# Patient Record
Sex: MALE | Race: OTHER | Employment: FULL TIME | URBAN - METROPOLITAN AREA
[De-identification: names, ages, dates, MRNs, and addresses within clinical notes are randomized per-mention and may not be internally consistent; named-entity substitution may affect disease eponyms.]

---

## 2017-03-03 ENCOUNTER — APPOINTMENT (OUTPATIENT)
Dept: OTHER | Facility: HOSPITAL | Age: 32
End: 2017-03-03
Attending: EMERGENCY MEDICINE

## 2017-03-06 ENCOUNTER — OFFICE VISIT (OUTPATIENT)
Dept: OTHER | Facility: HOSPITAL | Age: 32
End: 2017-03-06
Attending: EMERGENCY MEDICINE

## 2017-03-06 ENCOUNTER — HOSPITAL ENCOUNTER (OUTPATIENT)
Dept: GENERAL RADIOLOGY | Facility: HOSPITAL | Age: 32
Discharge: HOME | End: 2017-03-06
Attending: EMERGENCY MEDICINE

## 2017-03-06 DIAGNOSIS — R76.11 TUBERCULIN SKIN TEST (TST) POSITIVE: ICD-10-CM

## 2017-03-06 DIAGNOSIS — R76.11 TUBERCULIN SKIN TEST (TST) POSITIVE: Primary | ICD-10-CM

## 2017-03-06 PROCEDURE — 71010 XR CHEST AP/PA (1 VIEW) (CPT=71010): CPT

## 2017-11-08 ENCOUNTER — APPOINTMENT (OUTPATIENT)
Dept: OTHER | Facility: HOSPITAL | Age: 32
End: 2017-11-08
Attending: EMERGENCY MEDICINE

## 2018-07-24 ENCOUNTER — HOSPITAL ENCOUNTER (OUTPATIENT)
Age: 33
Discharge: HOME | End: 2018-07-24
Attending: EMERGENCY MEDICINE
Payer: MEDICAID

## 2018-07-24 VITALS
DIASTOLIC BLOOD PRESSURE: 82 MMHG | RESPIRATION RATE: 20 BRPM | TEMPERATURE: 98 F | BODY MASS INDEX: 26.66 KG/M2 | SYSTOLIC BLOOD PRESSURE: 128 MMHG | WEIGHT: 180 LBS | OXYGEN SATURATION: 100 % | HEIGHT: 69 IN | HEART RATE: 80 BPM

## 2018-07-24 DIAGNOSIS — N49.2 SCROTAL ABSCESS: Primary | ICD-10-CM

## 2018-07-24 LAB — GLUCOSE BLDC GLUCOMTR-MCNC: 105 MG/DL (ref 70–99)

## 2018-07-24 PROCEDURE — 87147 CULTURE TYPE IMMUNOLOGIC: CPT | Performed by: EMERGENCY MEDICINE

## 2018-07-24 PROCEDURE — 87070 CULTURE OTHR SPECIMN AEROBIC: CPT | Performed by: EMERGENCY MEDICINE

## 2018-07-24 PROCEDURE — 82962 GLUCOSE BLOOD TEST: CPT

## 2018-07-24 PROCEDURE — 99204 OFFICE O/P NEW MOD 45 MIN: CPT

## 2018-07-24 PROCEDURE — 87186 SC STD MICRODIL/AGAR DIL: CPT | Performed by: EMERGENCY MEDICINE

## 2018-07-24 PROCEDURE — 55100 DRAINAGE OF SCROTUM ABSCESS: CPT

## 2018-07-24 PROCEDURE — 87205 SMEAR GRAM STAIN: CPT | Performed by: EMERGENCY MEDICINE

## 2018-07-24 RX ORDER — AMOXICILLIN AND CLAVULANATE POTASSIUM 875; 125 MG/1; MG/1
1 TABLET, FILM COATED ORAL 2 TIMES DAILY
Qty: 20 TABLET | Refills: 0 | Status: SHIPPED | OUTPATIENT
Start: 2018-07-24 | End: 2018-07-26 | Stop reason: ALTCHOICE

## 2018-07-24 NOTE — ED INITIAL ASSESSMENT (HPI)
Was at work Saturday and noticed a lump/growth or ? Hernia in between his legs under testicles with pain . Was lifting objects at work.

## 2018-07-24 NOTE — ED NOTES
Culture sent to lab , dressing applied and to change 2 x a day and as needed. Fill and take po meds, call pcp and make appointment for follow up in office in 2 days watch for fever increase pain and drainage.

## 2018-07-24 NOTE — ED PROVIDER NOTES
Patient Seen in: [facility] In [address] [place] [address]    History   Patient presents with:  Eval-G (gynecologic)    Stated Complaint: groin pain    HPI    Patient is a 61-year-old male with previous history of abscess on the face who presents now focal swelling or tenderness  Skin: As above      ED Course     Labs Reviewed   AEROBIC BACTERIAL CULTURE       ED Course as of Jul 24 1143  ------------------------------------------------------------   Procedure note:  After anesthetizing the area with 1

## 2018-07-26 ENCOUNTER — TELEPHONE (OUTPATIENT)
Dept: SURGERY | Facility: CLINIC | Age: 33
End: 2018-07-26

## 2018-07-26 ENCOUNTER — HOSPITAL ENCOUNTER (OUTPATIENT)
Age: 33
Discharge: HOME | End: 2018-07-26
Attending: EMERGENCY MEDICINE
Payer: MEDICAID

## 2018-07-26 VITALS
TEMPERATURE: 98 F | WEIGHT: 185 LBS | BODY MASS INDEX: 27 KG/M2 | HEART RATE: 89 BPM | OXYGEN SATURATION: 99 % | RESPIRATION RATE: 16 BRPM | DIASTOLIC BLOOD PRESSURE: 74 MMHG | SYSTOLIC BLOOD PRESSURE: 113 MMHG

## 2018-07-26 DIAGNOSIS — N49.2 SCROTAL WALL ABSCESS: Primary | ICD-10-CM

## 2018-07-26 PROCEDURE — 99213 OFFICE O/P EST LOW 20 MIN: CPT

## 2018-07-26 RX ORDER — CLINDAMYCIN HYDROCHLORIDE 300 MG/1
300 CAPSULE ORAL 4 TIMES DAILY
Qty: 40 CAPSULE | Refills: 0 | Status: SHIPPED | OUTPATIENT
Start: 2018-07-26 | End: 2021-05-12

## 2018-07-26 NOTE — ED INITIAL ASSESSMENT (HPI)
Patient here for a scrotal abscess that was incised and drained here two days ago. Was instructed to follow up here in the immediate care. States he is feeling better. Pain is better controlled and only aggravated with movement. Denies any fevers.  Continu

## 2018-08-09 NOTE — TELEPHONE ENCOUNTER
Phoned pt and spoke with him. Offered the next soonest available consultation (September). Pt states he is better now and declined the appt offer. He was thankful.

## 2021-05-12 ENCOUNTER — OFFICE VISIT (OUTPATIENT)
Dept: FAMILY MEDICINE CLINIC | Facility: CLINIC | Age: 36
End: 2021-05-12
Payer: COMMERCIAL

## 2021-05-12 ENCOUNTER — HOSPITAL ENCOUNTER (OUTPATIENT)
Dept: GENERAL RADIOLOGY | Age: 36
Discharge: HOME | End: 2021-05-12
Attending: FAMILY MEDICINE
Payer: COMMERCIAL

## 2021-05-12 VITALS
HEART RATE: 71 BPM | DIASTOLIC BLOOD PRESSURE: 80 MMHG | BODY MASS INDEX: 28.49 KG/M2 | HEIGHT: 69 IN | SYSTOLIC BLOOD PRESSURE: 132 MMHG | WEIGHT: 192.38 LBS

## 2021-05-12 DIAGNOSIS — G89.29 CHRONIC LEFT SHOULDER PAIN: ICD-10-CM

## 2021-05-12 DIAGNOSIS — M25.512 CHRONIC LEFT SHOULDER PAIN: ICD-10-CM

## 2021-05-12 DIAGNOSIS — K42.9 UMBILICAL HERNIA WITHOUT OBSTRUCTION AND WITHOUT GANGRENE: ICD-10-CM

## 2021-05-12 DIAGNOSIS — L20.84 INTRINSIC ECZEMA: ICD-10-CM

## 2021-05-12 DIAGNOSIS — M54.6 CHRONIC LEFT-SIDED THORACIC BACK PAIN: Primary | ICD-10-CM

## 2021-05-12 DIAGNOSIS — G89.29 CHRONIC LEFT-SIDED THORACIC BACK PAIN: Primary | ICD-10-CM

## 2021-05-12 PROCEDURE — 99203 OFFICE O/P NEW LOW 30 MIN: CPT | Performed by: FAMILY MEDICINE

## 2021-05-12 PROCEDURE — 3075F SYST BP GE 130 - 139MM HG: CPT | Performed by: FAMILY MEDICINE

## 2021-05-12 PROCEDURE — 3079F DIAST BP 80-89 MM HG: CPT | Performed by: FAMILY MEDICINE

## 2021-05-12 PROCEDURE — 3008F BODY MASS INDEX DOCD: CPT | Performed by: FAMILY MEDICINE

## 2021-05-12 PROCEDURE — 81003 URINALYSIS AUTO W/O SCOPE: CPT | Performed by: FAMILY MEDICINE

## 2021-05-12 PROCEDURE — 73030 X-RAY EXAM OF SHOULDER: CPT | Performed by: FAMILY MEDICINE

## 2021-05-12 RX ORDER — CLOTRIMAZOLE AND BETAMETHASONE DIPROPIONATE 10; .64 MG/G; MG/G
1 CREAM TOPICAL 2 TIMES DAILY PRN
Qty: 60 G | Refills: 3 | Status: SHIPPED | OUTPATIENT
Start: 2021-05-12

## 2021-05-12 NOTE — PROGRESS NOTES
5/12/2021  9:44 AM    Drew Montez is a 38year old male.     Chief complaint(s): Patient presents with:  Back Pain: c/o mid left back pian   Shoulder Pain  Umbilical Hernia    HPI:     Danae Caceres primary complaint is regarding multipl is no immunization history on file for this patient.     Medications (Active prior to today's visit):  Current Outpatient Medications   Medication Sig Dispense Refill   • clotrimazole-betamethasone 1-0.05 % External Cream Apply 1 Application topically 2 (tw left-sided thoracic back pain  (primary encounter diagnosis)  Chronic left shoulder pain  Intrinsic eczema  Umbilical hernia without obstruction and without gangrene    1.  Chronic left-sided thoracic back pain            LABORATORY & ORDERS: Orders Placed concerns that may come up in the near future. Notify Dr Pedro Ty or the [facility] if there is a deterioration or worsening of the medical condition. Also, inform the doctor with any new symptoms or medications' side effects.       FOLLOW-UP: Schedule

## 2021-09-09 ENCOUNTER — OFFICE VISIT (OUTPATIENT)
Dept: FAMILY MEDICINE CLINIC | Facility: CLINIC | Age: 36
End: 2021-09-09
Payer: COMMERCIAL

## 2021-09-09 VITALS
HEART RATE: 79 BPM | SYSTOLIC BLOOD PRESSURE: 114 MMHG | BODY MASS INDEX: 28.14 KG/M2 | WEIGHT: 190 LBS | DIASTOLIC BLOOD PRESSURE: 78 MMHG | HEIGHT: 69 IN

## 2021-09-09 DIAGNOSIS — H60.331 ACUTE SWIMMER'S EAR OF RIGHT SIDE: Primary | ICD-10-CM

## 2021-09-09 PROCEDURE — 99213 OFFICE O/P EST LOW 20 MIN: CPT | Performed by: FAMILY MEDICINE

## 2021-09-09 PROCEDURE — 3074F SYST BP LT 130 MM HG: CPT | Performed by: FAMILY MEDICINE

## 2021-09-09 PROCEDURE — 3008F BODY MASS INDEX DOCD: CPT | Performed by: FAMILY MEDICINE

## 2021-09-09 PROCEDURE — 3078F DIAST BP <80 MM HG: CPT | Performed by: FAMILY MEDICINE

## 2021-09-09 RX ORDER — AMOXICILLIN AND CLAVULANATE POTASSIUM 875; 125 MG/1; MG/1
1 TABLET, FILM COATED ORAL 2 TIMES DAILY
Qty: 20 TABLET | Refills: 0 | Status: SHIPPED | OUTPATIENT
Start: 2021-09-09 | End: 2021-09-19

## 2021-09-09 RX ORDER — CIPROFLOXACIN AND FLUOCINOLONE ACETONIDE .75; .0625 MG/.25ML; MG/.25ML
4 SOLUTION AURICULAR (OTIC) 2 TIMES DAILY
Qty: 14 EACH | Refills: 0 | Status: SHIPPED | OUTPATIENT
Start: 2021-09-09 | End: 2021-09-13

## 2021-09-09 NOTE — PROGRESS NOTES
9/9/2021  2:54 PM    Annika Saucedo is a 43year old male. Chief complaint(s): Patient presents with:  Ear Pain: right ear x 5 days    HPI:     Annika Saucedo primary complaint is regarding right ear pain.      So patient is a 26-year-old and headaches. PHYSICAL EXAM:   VS: /78   Pulse 79   Ht 5' 9\" (1.753 m)   Wt 190 lb (86.2 kg)   BMI 28.06 kg/m²     Physical Exam  Vitals reviewed. Constitutional:       Appearance: Normal appearance. He is well-developed.    HENT:      Head: daily for 10 days. • Ciprofloxacin-Fluocinolone PF 0.3-0.025 % Otic Solution 14 each 0     Sig: Place 4 drops in ear(s) 2 (two) times a day.        Imaging & Referrals:  None         Kwaku Jose MD

## 2021-09-10 ENCOUNTER — PATIENT MESSAGE (OUTPATIENT)
Dept: FAMILY MEDICINE CLINIC | Facility: CLINIC | Age: 36
End: 2021-09-10

## 2021-09-10 NOTE — TELEPHONE ENCOUNTER
From: Jelena Lopez  To: Stephie Murillo MD  Sent: 9/10/2021 2:09 PM CDT  Subject: Prescription Question    Hi dr Bustillo Dominqiue problemas con unos de los medicamentos recetados (Ciprofloxacin-fluocinolone)el cual no se encuentra en las pharmacy

## 2021-09-11 ENCOUNTER — TELEPHONE (OUTPATIENT)
Dept: FAMILY MEDICINE CLINIC | Facility: CLINIC | Age: 36
End: 2021-09-11

## 2021-09-11 RX ORDER — CIPROFLOXACIN/HYDROCORTISONE 0.2 %-1 %
3 SUSPENSION, DROPS(FINAL DOSAGE FORM)(ML) OTIC (EAR) 2 TIMES DAILY
Qty: 10 ML | Refills: 0 | Status: SHIPPED | OUTPATIENT
Start: 2021-09-11

## 2021-09-11 NOTE — TELEPHONE ENCOUNTER
Per pharmacy, PA needed for the following medication:    •  Ciprofloxacin-Hydrocortisone (CIPRO HC) 0.2-1 % Otic Suspension, Place 3 drops into the right ear 2 (two) times daily. , Disp: 10 mL, Rfl: 0    Please call plan at [phone] to initiate PA or co

## 2021-09-13 NOTE — TELEPHONE ENCOUNTER
Prior authorization for cipro hc has been initiated through [facility] using keycode: [facility] It takes about 1-5 business days for a decision to come back.

## 2021-09-20 ENCOUNTER — OFFICE VISIT (OUTPATIENT)
Dept: FAMILY MEDICINE CLINIC | Facility: CLINIC | Age: 36
End: 2021-09-20
Payer: COMMERCIAL

## 2021-09-20 VITALS
DIASTOLIC BLOOD PRESSURE: 68 MMHG | HEIGHT: 69 IN | WEIGHT: 192.5 LBS | SYSTOLIC BLOOD PRESSURE: 121 MMHG | RESPIRATION RATE: 16 BRPM | HEART RATE: 89 BPM | BODY MASS INDEX: 28.51 KG/M2

## 2021-09-20 DIAGNOSIS — H60.331 ACUTE SWIMMER'S EAR OF RIGHT SIDE: Primary | ICD-10-CM

## 2021-09-20 PROCEDURE — 3074F SYST BP LT 130 MM HG: CPT | Performed by: FAMILY MEDICINE

## 2021-09-20 PROCEDURE — 3008F BODY MASS INDEX DOCD: CPT | Performed by: FAMILY MEDICINE

## 2021-09-20 PROCEDURE — 99212 OFFICE O/P EST SF 10 MIN: CPT | Performed by: FAMILY MEDICINE

## 2021-09-20 PROCEDURE — 3078F DIAST BP <80 MM HG: CPT | Performed by: FAMILY MEDICINE

## 2021-09-20 NOTE — PROGRESS NOTES
9/20/2021  10:07 AM    Suad  is a 43year old male. Chief complaint(s): Patient presents with: Follow - Up: R ear. Feels better, denies any pain. HPI:     Suad Smith primary complaint is regarding as above.      patient headaches. PHYSICAL EXAM:   VS: /68   Pulse 89   Resp 16   Ht 5' 9\" (1.753 m)   Wt 192 lb 8 oz (87.3 kg)   BMI 28.43 kg/m²     Physical Exam  Vitals reviewed.    HENT:      Right Ear: Tympanic membrane and ear canal normal.      Left Ear: Tympa

## 2023-02-14 NOTE — ED PROVIDER NOTES
Patient Seen in: [facility]mediate Care In [address] [place] [address]    History   Patient presents with:  Laceration Abrasion (integumentary)    Stated Complaint: follow up    HPI    Patient presents to the immediate care center after being seen 2 days ago for Course   Labs Reviewed - No data to display    ED Course as of Jul 26 1139  ------------------------------------------------------------      MDM      Pulse Ox: 99%, Normal,   Review of the cultures sent 2 days ago shows that the patient is positive for MR Face Mask

## (undated) NOTE — LETTER
Date & Time: 7/26/2018, 11:39 AM  Patient: Shania Meredith  Encounter Provider(s):    Tomeka Caal MD       To Whom It May Concern:    Shania Meredith was seen and treated in our department on 7/26/2018.  He should not return to work until 9